# Patient Record
Sex: MALE | Race: BLACK OR AFRICAN AMERICAN | Employment: FULL TIME | URBAN - METROPOLITAN AREA
[De-identification: names, ages, dates, MRNs, and addresses within clinical notes are randomized per-mention and may not be internally consistent; named-entity substitution may affect disease eponyms.]

---

## 2019-08-10 ENCOUNTER — HOSPITAL ENCOUNTER (EMERGENCY)
Age: 44
Discharge: HOME | End: 2019-08-10

## 2019-08-10 VITALS
WEIGHT: 273 LBS | BODY MASS INDEX: 33.24 KG/M2 | HEIGHT: 76 IN | HEART RATE: 51 BPM | DIASTOLIC BLOOD PRESSURE: 82 MMHG | RESPIRATION RATE: 14 BRPM | SYSTOLIC BLOOD PRESSURE: 173 MMHG | OXYGEN SATURATION: 100 % | TEMPERATURE: 98 F

## 2019-08-10 DIAGNOSIS — S81.811A LACERATION OF RIGHT LOWER EXTREMITY EXCLUDING THIGH, INITIAL ENCOUNTER: Primary | ICD-10-CM

## 2019-08-10 PROCEDURE — 4500000022 HC ED LEVEL 2 PROCEDURE

## 2019-08-10 PROCEDURE — 99282 EMERGENCY DEPT VISIT SF MDM: CPT

## 2019-08-10 ASSESSMENT — PAIN DESCRIPTION - LOCATION: LOCATION: LEG

## 2019-08-10 ASSESSMENT — ENCOUNTER SYMPTOMS: COLOR CHANGE: 0

## 2019-08-10 ASSESSMENT — PAIN DESCRIPTION - ORIENTATION: ORIENTATION: RIGHT

## 2019-08-10 NOTE — ED PROVIDER NOTES
WITH epi  Laceration details:     Location:  Leg    Leg location:  R lower leg    Length (cm):  7  Repair type:     Repair type: Intermediate  Pre-procedure details:     Preparation:  Patient was prepped and draped in usual sterile fashion  Exploration:     Hemostasis achieved with:  Epinephrine    Wound exploration: wound explored through full range of motion and entire depth of wound probed and visualized      Contaminated: no    Treatment:     Area cleansed with:  Hibiclens and saline    Amount of cleaning:  Extensive    Irrigation solution:  Sterile water    Irrigation method:  Syringe  Skin repair:     Repair method:  Sutures    Suture size:  4-0    Suture material:  Nylon    Suture technique:  Running locked  Approximation:     Approximation:  Close  Post-procedure details:     Dressing:  Antibiotic ointment and non-adherent dressing    Patient tolerance of procedure: Tolerated well, no immediate complications  Comments:      Running retention suture with 13 throws, and 3 single interrupted sutures. Patient was given:  Medications - No data to display  This is a  37 y.o. male who presents to the emergency department with complaints of laceration to right lower extremity. Patient states that this occurred just prior to arrival to emergency department when he was cleaning out a house, states that there was a glass mirror that was sitting there and he scraped his leg across it. He denies any breakage of glass and does not believe that there is any foreign body. He denies any pain. Denies any distal numbness tingling or weakness. Tetanus up-to-date within the past 3 years. No other complaints of the time of evaluation. On exam patient is well-appearing, he did sustain a approximate 2 inch laceration to the posterior aspect of right calf.   This was cleaned thoroughly and sutures were placed, 15 throws in total.  He does have a PCP for suture removal.  Given signs of infection and return precautions

## 2019-09-02 ENCOUNTER — HOSPITAL ENCOUNTER (EMERGENCY)
Age: 44
Discharge: HOME HEALTH | End: 2019-09-02
Payer: COMMERCIAL

## 2019-09-02 VITALS
SYSTOLIC BLOOD PRESSURE: 143 MMHG | HEART RATE: 57 BPM | OXYGEN SATURATION: 100 % | RESPIRATION RATE: 18 BRPM | BODY MASS INDEX: 33.24 KG/M2 | DIASTOLIC BLOOD PRESSURE: 88 MMHG | HEIGHT: 76 IN | WEIGHT: 273 LBS | TEMPERATURE: 98.6 F

## 2019-09-02 DIAGNOSIS — Z48.02 VISIT FOR SUTURE REMOVAL: Primary | ICD-10-CM

## 2019-09-02 PROCEDURE — 99281 EMR DPT VST MAYX REQ PHY/QHP: CPT

## 2019-09-02 SDOH — HEALTH STABILITY: MENTAL HEALTH: HOW OFTEN DO YOU HAVE A DRINK CONTAINING ALCOHOL?: NEVER

## 2019-09-02 NOTE — ED NOTES
Pt here for suture removal of sutures in right calf. states he was to have them removed after 14 days, but it has been 3 weeks. Site appears well defined with edges approximated. No drainage. Pt A&O.       Helene Denny RN  09/02/19 0911